# Patient Record
Sex: MALE | Race: WHITE | Employment: UNEMPLOYED | URBAN - METROPOLITAN AREA
[De-identification: names, ages, dates, MRNs, and addresses within clinical notes are randomized per-mention and may not be internally consistent; named-entity substitution may affect disease eponyms.]

---

## 2022-02-25 ENCOUNTER — APPOINTMENT (OUTPATIENT)
Dept: CT IMAGING | Age: 37
End: 2022-02-25
Payer: COMMERCIAL

## 2022-02-25 ENCOUNTER — HOSPITAL ENCOUNTER (EMERGENCY)
Age: 37
Discharge: HOME | End: 2022-02-25
Attending: EMERGENCY MEDICINE
Payer: COMMERCIAL

## 2022-02-25 VITALS
WEIGHT: 215 LBS | OXYGEN SATURATION: 100 % | HEART RATE: 66 BPM | SYSTOLIC BLOOD PRESSURE: 122 MMHG | TEMPERATURE: 98 F | DIASTOLIC BLOOD PRESSURE: 71 MMHG | HEIGHT: 71 IN | BODY MASS INDEX: 30.1 KG/M2 | RESPIRATION RATE: 16 BRPM

## 2022-02-25 DIAGNOSIS — F13.10 XANAX USE DISORDER, MILD (HCC): ICD-10-CM

## 2022-02-25 DIAGNOSIS — R40.0 SOMNOLENCE: Primary | ICD-10-CM

## 2022-02-25 LAB
ANION GAP SERPL CALCULATED.3IONS-SCNC: 9 MMOL/L (ref 3–16)
BASOPHILS ABSOLUTE: 0.1 K/UL (ref 0–0.2)
BASOPHILS RELATIVE PERCENT: 0.9 %
BUN BLDV-MCNC: 13 MG/DL (ref 7–20)
CALCIUM SERPL-MCNC: 9.1 MG/DL (ref 8.3–10.6)
CHLORIDE BLD-SCNC: 99 MMOL/L (ref 99–110)
CO2: 28 MMOL/L (ref 21–32)
CREAT SERPL-MCNC: 1 MG/DL (ref 0.9–1.3)
EOSINOPHILS ABSOLUTE: 0.1 K/UL (ref 0–0.6)
EOSINOPHILS RELATIVE PERCENT: 1.7 %
ETHANOL: NORMAL MG/DL (ref 0–0.08)
GFR AFRICAN AMERICAN: >60
GFR NON-AFRICAN AMERICAN: >60
GLUCOSE BLD-MCNC: 167 MG/DL (ref 70–99)
HCT VFR BLD CALC: 37.8 % (ref 40.5–52.5)
HEMOGLOBIN: 13 G/DL (ref 13.5–17.5)
LYMPHOCYTES ABSOLUTE: 1.1 K/UL (ref 1–5.1)
LYMPHOCYTES RELATIVE PERCENT: 13.5 %
MCH RBC QN AUTO: 29.8 PG (ref 26–34)
MCHC RBC AUTO-ENTMCNC: 34.4 G/DL (ref 31–36)
MCV RBC AUTO: 86.6 FL (ref 80–100)
MONOCYTES ABSOLUTE: 0.6 K/UL (ref 0–1.3)
MONOCYTES RELATIVE PERCENT: 7.7 %
NEUTROPHILS ABSOLUTE: 6.3 K/UL (ref 1.7–7.7)
NEUTROPHILS RELATIVE PERCENT: 76.2 %
PDW BLD-RTO: 12.8 % (ref 12.4–15.4)
PLATELET # BLD: 329 K/UL (ref 135–450)
PMV BLD AUTO: 7.2 FL (ref 5–10.5)
POTASSIUM REFLEX MAGNESIUM: 3.6 MMOL/L (ref 3.5–5.1)
RBC # BLD: 4.36 M/UL (ref 4.2–5.9)
SODIUM BLD-SCNC: 136 MMOL/L (ref 136–145)
SPECIMEN STATUS: NORMAL
WBC # BLD: 8.3 K/UL (ref 4–11)

## 2022-02-25 PROCEDURE — 85025 COMPLETE CBC W/AUTO DIFF WBC: CPT

## 2022-02-25 PROCEDURE — 82077 ASSAY SPEC XCP UR&BREATH IA: CPT

## 2022-02-25 PROCEDURE — 70450 CT HEAD/BRAIN W/O DYE: CPT

## 2022-02-25 PROCEDURE — 2580000003 HC RX 258: Performed by: EMERGENCY MEDICINE

## 2022-02-25 PROCEDURE — 99284 EMERGENCY DEPT VISIT MOD MDM: CPT

## 2022-02-25 PROCEDURE — 80048 BASIC METABOLIC PNL TOTAL CA: CPT

## 2022-02-25 PROCEDURE — 6370000000 HC RX 637 (ALT 250 FOR IP): Performed by: EMERGENCY MEDICINE

## 2022-02-25 RX ORDER — AMMONIA INHALANTS 0.04 G/.3ML
INHALANT RESPIRATORY (INHALATION)
Status: DISCONTINUED
Start: 2022-02-25 | End: 2022-02-25 | Stop reason: HOSPADM

## 2022-02-25 RX ORDER — AMMONIA INHALANTS 0.04 G/.3ML
0.3 INHALANT RESPIRATORY (INHALATION) ONCE
Status: COMPLETED | OUTPATIENT
Start: 2022-02-25 | End: 2022-02-25

## 2022-02-25 RX ORDER — 0.9 % SODIUM CHLORIDE 0.9 %
1000 INTRAVENOUS SOLUTION INTRAVENOUS ONCE
Status: COMPLETED | OUTPATIENT
Start: 2022-02-25 | End: 2022-02-25

## 2022-02-25 RX ADMIN — SODIUM CHLORIDE 1000 ML: 9 INJECTION, SOLUTION INTRAVENOUS at 01:57

## 2022-02-25 RX ADMIN — AMMONIA INHALANTS 0.3 ML: 0.04 INHALANT RESPIRATORY (INHALATION) at 05:14

## 2022-02-25 ASSESSMENT — PAIN DESCRIPTION - LOCATION: LOCATION: NECK

## 2022-02-25 ASSESSMENT — PAIN DESCRIPTION - PAIN TYPE: TYPE: ACUTE PAIN

## 2022-02-25 ASSESSMENT — PAIN SCALES - GENERAL: PAINLEVEL_OUTOF10: 7

## 2022-02-25 ASSESSMENT — PAIN - FUNCTIONAL ASSESSMENT: PAIN_FUNCTIONAL_ASSESSMENT: 0-10

## 2022-02-25 NOTE — ED NOTES
Bed: 17  Expected date:   Expected time:   Means of arrival:   Comments:  Aubrey 57     Carmela Taylor RN  02/25/22 5846

## 2022-02-25 NOTE — ED NOTES
Patient left via personal vehicle to private residence in stable condition with police. Patients vitals were assessed before discharge and were stable. Patient verbalized understanding of discharge instructions, follow up and medications. RN explained information in discharge packet and patient verbalized they have no questions at this time. Patient left ER with all paperwork and belongings that RN is aware of.         Keshawn Morgan RN  02/25/22 0919

## 2022-02-25 NOTE — ED PROVIDER NOTES
 Smoking status: Current Every Day Smoker     Packs/day: 0.50     Years: 8.00     Pack years: 4.00    Smokeless tobacco: Never Used   Substance and Sexual Activity    Alcohol use: Yes     Comment: occasional    Drug use: Not on file    Sexual activity: Not on file   Other Topics Concern    Not on file   Social History Narrative    Not on file     Social Determinants of Health     Financial Resource Strain:     Difficulty of Paying Living Expenses: Not on file   Food Insecurity:     Worried About Running Out of Food in the Last Year: Not on file    Justus of Food in the Last Year: Not on file   Transportation Needs:     Lack of Transportation (Medical): Not on file    Lack of Transportation (Non-Medical): Not on file   Physical Activity:     Days of Exercise per Week: Not on file    Minutes of Exercise per Session: Not on file   Stress:     Feeling of Stress : Not on file   Social Connections:     Frequency of Communication with Friends and Family: Not on file    Frequency of Social Gatherings with Friends and Family: Not on file    Attends [employer] Services: Not on file    Active Member of [address] [place] [address] or Organizations: Not on file    Attends Club or Organization Meetings: Not on file    Marital Status: Not on file   Intimate Partner Violence:     Fear of Current or Ex-Partner: Not on file    Emotionally Abused: Not on file    Physically Abused: Not on file    Sexually Abused: Not on file   Housing Stability:     Unable to Pay for Housing in the Last Year: Not on file    Number of [place] in the Last Year: Not on file    Unstable Housing in the Last Year: Not on file     No current facility-administered medications for this encounter. Current Outpatient Medications   Medication Sig Dispense Refill    ibuprofen (IBU) 800 MG tablet Take 1 tablet by mouth every 8 hours as needed for Pain for 10 days.  30 tablet 0    alprazolam (XANAX) 1 MG tablet        No Known Allergies    REVIEW OF SYSTEMS  Limited secondary to somnolence. General:  No fevers  Eyes:  No recent vison changes  ENT:  No sore throat, no nasal congestion  Cardiovascular:  no palpitations  Respiratory:   no cough, no wheezing  Gastrointestinal:  No abdominal pain, no vomiting, no diarrhea  Musculoskeletal:  No muscle pain, no joint pain  Skin:  No rash   Neurologic:  No speech problems, no headache, no extremity numbness, no extremity weakness  Genitourinary:  No dysuria  Extremities:  no edema, no pain      Unless otherwise stated in this report, this patient's positive and negative responses for review of systems (constitutional, eyes, ENT, cardiovascular, respiratory, gastrointestinal, neurological, genitourinary, musculoskeletal, integument systems and systems related to the presenting problem) are either stated in the preceding paragraph, were not pertinent or were negative for the symptoms and/or complaints related to the medical problem. PHYSICAL EXAM  /71   Pulse 66   Temp 98 °F (36.7 °C) (Oral)   Resp 16   Ht 5' 11\" (1.803 m)   Wt 215 lb (97.5 kg)   SpO2 100%   BMI 29.99 kg/m²   GENERAL APPEARANCE: Somnolent but easily arousable to voice. Cooperative. No acute distress. HEAD: Normocephalic. Atraumatic. EYES: EOM's grossly intact. ENT: Mucous membranes are moist.   NECK: Supple, trachea midline. HEART: RRR. LUNGS: Respirations unlabored. CTAB. Good air exchange. No wheezes, rales, or rhonchi. Speaking comfortably in full sentences. ABDOMEN: Soft. Non-distended. Non-tender. No guarding or rebound. EXTREMITIES: No peripheral edema. MAEE. No acute deformities. SKIN: Warm, dry and intact. No acute rashes. NEUROLOGICAL: Somnolent but easily arousable. Oriented x3. Cranial nerves II through XII grossly intact. No focal motor or sensory deficits. PSYCHIATRIC: Normal mood and affect. LABS  I have reviewed all labs for this visit.    Results for orders placed or performed during the hospital encounter of 02/25/22   Ethanol   Result Value Ref Range    Ethanol Lvl None Detected mg/dL   CBC with Auto Differential   Result Value Ref Range    WBC 8.3 4.0 - 11.0 K/uL    RBC 4.36 4.20 - 5.90 M/uL    Hemoglobin 13.0 (L) 13.5 - 17.5 g/dL    Hematocrit 37.8 (L) 40.5 - 52.5 %    MCV 86.6 80.0 - 100.0 fL    MCH 29.8 26.0 - 34.0 pg    MCHC 34.4 31.0 - 36.0 g/dL    RDW 12.8 12.4 - 15.4 %    Platelets 170 527 - 422 K/uL    MPV 7.2 5.0 - 10.5 fL    Neutrophils % 76.2 %    Lymphocytes % 13.5 %    Monocytes % 7.7 %    Eosinophils % 1.7 %    Basophils % 0.9 %    Neutrophils Absolute 6.3 1.7 - 7.7 K/uL    Lymphocytes Absolute 1.1 1.0 - 5.1 K/uL    Monocytes Absolute 0.6 0.0 - 1.3 K/uL    Eosinophils Absolute 0.1 0.0 - 0.6 K/uL    Basophils Absolute 0.1 0.0 - 0.2 K/uL   Basic Metabolic Panel w/ Reflex to MG   Result Value Ref Range    Sodium 136 136 - 145 mmol/L    Potassium reflex Magnesium 3.6 3.5 - 5.1 mmol/L    Chloride 99 99 - 110 mmol/L    CO2 28 21 - 32 mmol/L    Anion Gap 9 3 - 16    Glucose 167 (H) 70 - 99 mg/dL    BUN 13 7 - 20 mg/dL    CREATININE 1.0 0.9 - 1.3 mg/dL    GFR Non-African American >60 >60    GFR African American >60 >60    Calcium 9.1 8.3 - 10.6 mg/dL   Sample possible blood bank testing   Result Value Ref Range    Specimen Status NANCY              RADIOLOGY  X-RAYS: ALL IMAGES INCLUDING PLAIN FILMS, CT, ULTRASOUND AND MRI HAVE BEEN READ BY THE RADIOLOGIST. I have personally reviewed plain film images and have reviewed the radiology reports. CT HEAD WO CONTRAST   Final Result   No acute intracranial hemorrhage or ischemia. Rechecks: Physical assessment performed. Patient was updated on his CT and lab work findings. He was allowed to sleep in the ER for 4 hours. We went to try to wake him up he was fighting us and clenching his eyes closed. Would not cooperate. ED COURSE/MDM  Patient seen and evaluated. Here the patient is afebrile with normal vitals signs.  Old records reviewed. He appears intoxicated. He has slurred speech and is somnolent. He is initially quite easily arousable to voice. Alcohol level is negative. He does admit to taking Xanax tonight. Lab work is otherwise unremarkable. Head CT is normal.  [facility]. He was allowed to sleep. We went to wake up the patient in the morning he would not cooperate with us. He is still moving all extremities but is fighting against as. He is clenching his eyes shut when I try to open them. We actually had to call police to give the patient transportation home. When police arrived the patient woke up without issue and the police agreed to transport the patient home. We had attempted to call the patient's contacts for a ride home but there was no answer. I think his somnolence is secondary to drug ingestion and I do not suspect any infection. Even though he was somnolent on arrival he was able to tell me the events from [facility] and provide all of his personal information. He is not actually altered, just somnolent and intoxicated. He was counseled at length on the inappropriateness of abusing benzodiazepines and that it is very risky and can be life-threatening and overdose. He states understanding. labs and imaging reviewed and results discussed with patient. Patient was reassessed as noted above . Plan of care discussed with patient. Patient in agreement with plan. Strict return precautions have been given. Patient was given scripts for the following medications. I counseled patient how to take these medications. Discharge Medication List as of 2/25/2022  5:16 AM        No prescriptions given. CLINICAL IMPRESSION  1. Somnolence    2. Xanax use disorder, mild (HCC)        Blood pressure 122/71, pulse 66, temperature 98 °F (36.7 °C), temperature source Oral, resp.  rate 16, height 5' 11\" (1.803 m), weight 215 lb (97.5 kg), SpO2 100 %. DISPOSITION  Trev Hackett was discharged to home in stable condition.     (Please note this note was completed with a voice recognition program.  Efforts were made to edit the dictations but occasionally words are mis-transcribed.)        Ana Lilia Alcantara MD  02/26/22 0532

## 2022-03-28 ENCOUNTER — HOSPITAL ENCOUNTER (INPATIENT)
Age: 37
LOS: 2 days | Discharge: HOME | DRG: 751 | End: 2022-03-30
Attending: EMERGENCY MEDICINE | Admitting: PSYCHIATRY & NEUROLOGY
Payer: MEDICAID

## 2022-03-28 DIAGNOSIS — F22 PARANOIA (HCC): Primary | ICD-10-CM

## 2022-03-28 DIAGNOSIS — F29 PSYCHOSIS, UNSPECIFIED PSYCHOSIS TYPE (HCC): ICD-10-CM

## 2022-03-28 PROBLEM — F33.9 MAJOR DEPRESSIVE DISORDER, RECURRENT (HCC): Status: ACTIVE | Noted: 2022-03-28

## 2022-03-28 LAB
A/G RATIO: 2 (ref 1.1–2.2)
ACETAMINOPHEN LEVEL: <5 UG/ML (ref 10–30)
ALBUMIN SERPL-MCNC: 4.6 G/DL (ref 3.4–5)
ALP BLD-CCNC: 38 U/L (ref 40–129)
ALT SERPL-CCNC: 20 U/L (ref 10–40)
AMPHETAMINE SCREEN, URINE: POSITIVE
ANION GAP SERPL CALCULATED.3IONS-SCNC: 13 MMOL/L (ref 3–16)
AST SERPL-CCNC: 27 U/L (ref 15–37)
BARBITURATE SCREEN URINE: ABNORMAL
BASOPHILS ABSOLUTE: 0 K/UL (ref 0–0.2)
BASOPHILS RELATIVE PERCENT: 0.9 %
BENZODIAZEPINE SCREEN, URINE: POSITIVE
BILIRUB SERPL-MCNC: 0.5 MG/DL (ref 0–1)
BUN BLDV-MCNC: 11 MG/DL (ref 7–20)
CALCIUM SERPL-MCNC: 10 MG/DL (ref 8.3–10.6)
CANNABINOID SCREEN URINE: ABNORMAL
CHLORIDE BLD-SCNC: 99 MMOL/L (ref 99–110)
CO2: 27 MMOL/L (ref 21–32)
COCAINE METABOLITE SCREEN URINE: ABNORMAL
CREAT SERPL-MCNC: 0.9 MG/DL (ref 0.9–1.3)
EOSINOPHILS ABSOLUTE: 0.1 K/UL (ref 0–0.6)
EOSINOPHILS RELATIVE PERCENT: 2.8 %
ETHANOL: NORMAL MG/DL (ref 0–0.08)
GFR AFRICAN AMERICAN: >60
GFR NON-AFRICAN AMERICAN: >60
GLUCOSE BLD-MCNC: 112 MG/DL (ref 70–99)
HCT VFR BLD CALC: 34.9 % (ref 40.5–52.5)
HEMOGLOBIN: 11.9 G/DL (ref 13.5–17.5)
LYMPHOCYTES ABSOLUTE: 1.1 K/UL (ref 1–5.1)
LYMPHOCYTES RELATIVE PERCENT: 23 %
Lab: ABNORMAL
MCH RBC QN AUTO: 29.9 PG (ref 26–34)
MCHC RBC AUTO-ENTMCNC: 34.1 G/DL (ref 31–36)
MCV RBC AUTO: 87.7 FL (ref 80–100)
METHADONE SCREEN, URINE: ABNORMAL
MONOCYTES ABSOLUTE: 0.4 K/UL (ref 0–1.3)
MONOCYTES RELATIVE PERCENT: 8.5 %
NEUTROPHILS ABSOLUTE: 3.1 K/UL (ref 1.7–7.7)
NEUTROPHILS RELATIVE PERCENT: 64.8 %
OPIATE SCREEN URINE: ABNORMAL
OXYCODONE URINE: ABNORMAL
PDW BLD-RTO: 13.5 % (ref 12.4–15.4)
PH UA: 6
PHENCYCLIDINE SCREEN URINE: ABNORMAL
PLATELET # BLD: 308 K/UL (ref 135–450)
PMV BLD AUTO: 6.6 FL (ref 5–10.5)
POTASSIUM REFLEX MAGNESIUM: 3.9 MMOL/L (ref 3.5–5.1)
PROPOXYPHENE SCREEN: ABNORMAL
RBC # BLD: 3.98 M/UL (ref 4.2–5.9)
SALICYLATE, SERUM: <0.3 MG/DL (ref 15–30)
SARS-COV-2, NAAT: NOT DETECTED
SODIUM BLD-SCNC: 139 MMOL/L (ref 136–145)
TOTAL PROTEIN: 6.9 G/DL (ref 6.4–8.2)
TSH SERPL DL<=0.05 MIU/L-ACNC: 1.53 UIU/ML (ref 0.27–4.2)
WBC # BLD: 4.8 K/UL (ref 4–11)

## 2022-03-28 PROCEDURE — 80307 DRUG TEST PRSMV CHEM ANLYZR: CPT

## 2022-03-28 PROCEDURE — 87635 SARS-COV-2 COVID-19 AMP PRB: CPT

## 2022-03-28 PROCEDURE — 6370000000 HC RX 637 (ALT 250 FOR IP)

## 2022-03-28 PROCEDURE — 82077 ASSAY SPEC XCP UR&BREATH IA: CPT

## 2022-03-28 PROCEDURE — 6370000000 HC RX 637 (ALT 250 FOR IP): Performed by: PSYCHIATRY & NEUROLOGY

## 2022-03-28 PROCEDURE — 84443 ASSAY THYROID STIM HORMONE: CPT

## 2022-03-28 PROCEDURE — 80143 DRUG ASSAY ACETAMINOPHEN: CPT

## 2022-03-28 PROCEDURE — 80053 COMPREHEN METABOLIC PANEL: CPT

## 2022-03-28 PROCEDURE — 85025 COMPLETE CBC W/AUTO DIFF WBC: CPT

## 2022-03-28 PROCEDURE — 36415 COLL VENOUS BLD VENIPUNCTURE: CPT

## 2022-03-28 PROCEDURE — 80179 DRUG ASSAY SALICYLATE: CPT

## 2022-03-28 PROCEDURE — 1240000000 HC EMOTIONAL WELLNESS R&B

## 2022-03-28 PROCEDURE — 99283 EMERGENCY DEPT VISIT LOW MDM: CPT

## 2022-03-28 RX ORDER — MAGNESIUM HYDROXIDE/ALUMINUM HYDROXICE/SIMETHICONE 120; 1200; 1200 MG/30ML; MG/30ML; MG/30ML
30 SUSPENSION ORAL EVERY 6 HOURS PRN
Status: DISCONTINUED | OUTPATIENT
Start: 2022-03-28 | End: 2022-03-30 | Stop reason: HOSPADM

## 2022-03-28 RX ORDER — CLONAZEPAM 0.5 MG/1
0.5 TABLET ORAL 2 TIMES DAILY
Status: ON HOLD | COMMUNITY
End: 2022-03-30 | Stop reason: HOSPADM

## 2022-03-28 RX ORDER — HYDROXYZINE 50 MG/1
50 TABLET, FILM COATED ORAL 3 TIMES DAILY PRN
Status: DISCONTINUED | OUTPATIENT
Start: 2022-03-28 | End: 2022-03-30 | Stop reason: HOSPADM

## 2022-03-28 RX ORDER — OLANZAPINE 10 MG/1
10 TABLET ORAL EVERY 8 HOURS PRN
Status: DISCONTINUED | OUTPATIENT
Start: 2022-03-28 | End: 2022-03-29

## 2022-03-28 RX ORDER — BUPRENORPHINE AND NALOXONE 8; 2 MG/1; MG/1
1 FILM, SOLUBLE BUCCAL; SUBLINGUAL 2 TIMES DAILY
COMMUNITY

## 2022-03-28 RX ORDER — TRAZODONE HYDROCHLORIDE 50 MG/1
50 TABLET ORAL NIGHTLY PRN
Status: DISCONTINUED | OUTPATIENT
Start: 2022-03-28 | End: 2022-03-30 | Stop reason: HOSPADM

## 2022-03-28 RX ORDER — CLONAZEPAM 0.5 MG/1
0.5 TABLET ORAL 3 TIMES DAILY PRN
Status: DISCONTINUED | OUTPATIENT
Start: 2022-03-28 | End: 2022-03-29

## 2022-03-28 RX ORDER — ACETAMINOPHEN 325 MG/1
650 TABLET ORAL EVERY 4 HOURS PRN
Status: DISCONTINUED | OUTPATIENT
Start: 2022-03-28 | End: 2022-03-29

## 2022-03-28 RX ORDER — IBUPROFEN 800 MG/1
800 TABLET ORAL EVERY 8 HOURS PRN
Status: DISCONTINUED | OUTPATIENT
Start: 2022-03-28 | End: 2022-03-29

## 2022-03-28 RX ORDER — IBUPROFEN 400 MG/1
400 TABLET ORAL EVERY 6 HOURS PRN
Status: DISCONTINUED | OUTPATIENT
Start: 2022-03-28 | End: 2022-03-28

## 2022-03-28 RX ORDER — POLYETHYLENE GLYCOL 3350 17 G
2 POWDER IN PACKET (EA) ORAL
Status: DISCONTINUED | OUTPATIENT
Start: 2022-03-28 | End: 2022-03-30 | Stop reason: HOSPADM

## 2022-03-28 RX ORDER — DIPHENHYDRAMINE HYDROCHLORIDE 50 MG/ML
50 INJECTION INTRAMUSCULAR; INTRAVENOUS EVERY 4 HOURS PRN
Status: DISCONTINUED | OUTPATIENT
Start: 2022-03-28 | End: 2022-03-29

## 2022-03-28 RX ORDER — BUPRENORPHINE AND NALOXONE 8; 2 MG/1; MG/1
1 FILM, SOLUBLE BUCCAL; SUBLINGUAL 2 TIMES DAILY
Status: DISCONTINUED | OUTPATIENT
Start: 2022-03-28 | End: 2022-03-30 | Stop reason: HOSPADM

## 2022-03-28 RX ORDER — CLONAZEPAM 0.5 MG/1
0.5 TABLET ORAL 3 TIMES DAILY
Status: DISCONTINUED | OUTPATIENT
Start: 2022-03-28 | End: 2022-03-28

## 2022-03-28 RX ADMIN — IBUPROFEN 800 MG: 800 TABLET, FILM COATED ORAL at 23:14

## 2022-03-28 RX ADMIN — BUPRENORPHINE HYDROCHLORIDE, NALOXONE HYDROCHLORIDE 1 FILM: 8; 2 FILM, SOLUBLE BUCCAL; SUBLINGUAL at 12:04

## 2022-03-28 RX ADMIN — CLONAZEPAM 0.5 MG: 0.5 TABLET ORAL at 16:06

## 2022-03-28 RX ADMIN — CLONAZEPAM 0.5 MG: 0.5 TABLET ORAL at 22:12

## 2022-03-28 RX ADMIN — NICOTINE POLACRILEX 2 MG: 2 LOZENGE ORAL at 11:46

## 2022-03-28 RX ADMIN — BUPRENORPHINE HYDROCHLORIDE, NALOXONE HYDROCHLORIDE 1 FILM: 8; 2 FILM, SOLUBLE BUCCAL; SUBLINGUAL at 20:47

## 2022-03-28 ASSESSMENT — SLEEP AND FATIGUE QUESTIONNAIRES
DIFFICULTY FALLING ASLEEP: YES
SLEEP PATTERN: DIFFICULTY FALLING ASLEEP
AVERAGE NUMBER OF SLEEP HOURS: 6
DO YOU HAVE DIFFICULTY SLEEPING: YES
DIFFICULTY STAYING ASLEEP: NO
DIFFICULTY ARISING: NO
RESTFUL SLEEP: YES
DO YOU USE A SLEEP AID: YES

## 2022-03-28 ASSESSMENT — LIFESTYLE VARIABLES: HISTORY_ALCOHOL_USE: NO

## 2022-03-28 ASSESSMENT — PAIN SCALES - GENERAL
PAINLEVEL_OUTOF10: 0
PAINLEVEL_OUTOF10: 0
PAINLEVEL_OUTOF10: 6
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
## 2022-03-28 NOTE — ED NOTES
Patient vs obtained. Patient awoken growled at this nurse then laughed and fell back asleep. Breakfast was given to patient however continued to sleep.       Otilia Wheeler RN  03/28/22 0800

## 2022-03-28 NOTE — ED NOTES
Call received from [address] [place] [address] Dr Director of [facility] who reports to cancel transfer order. Reports we will have discharges today here at [facility]. Call placed to Juan Kelly and notified them patient to be admitted here at [facility].       Reva Cueto RN  03/28/22 0813       Reva Cueto RN  03/28/22 7474
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
## 2022-03-28 NOTE — ED NOTES
Attempted to obtain vitals after patient refusal. Patient again refused vital signs at this time.       Jenni Gutierres RN  03/28/22 5445

## 2022-03-28 NOTE — ED NOTES
Patient updated on plan of care. Patient is not happy related to decision to transfer and be admitted to inpatient psychiatric unit. This RN explained to process of admission, and the evaluation process.      Mally Chan RN  03/28/22 2503 Fernando Schulz  McRae Helena, RN  03/28/22 3211

## 2022-03-28 NOTE — ED NOTES
Patient resting in room with lights no, no acute distress noted.      Magdiel Díaz RN  03/28/22 6499

## 2022-03-28 NOTE — ED NOTES
Call placed to [facility]. Spoke with [place]. Reports patient was discharged on the 3/24. Patient given 14 day supply of Suboxine 8 mg to take 2 x day.       Matty Gibbons RN  03/28/22 8085

## 2022-03-28 NOTE — ED NOTES
Level of Care Disposition: Transfer      Patient was seen by ED provider and [facility] AN AFFILIATE OF [facility] staff. The case presented to psychiatric provider on-call BRUCE Jasso. Based on the ED evaluation and information presented to the provider by Petey Dangelo it is the recommendation of the on call psychiatric provider that inpatient hospitalization is the least restrictive environment for the patient at this time. The Transfer Center is aware of patient.          Dami Russell RN  03/28/22 1161
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
## 2022-03-28 NOTE — ED NOTES
Call placed to Litzy LYON to update her on Suboxine. Message left. Awaiting call back.       Javier Carlos RN  03/28/22 7465

## 2022-03-28 NOTE — ED NOTES
Patient awake and calm and cooperative. Will continue to monitor patient.       Javier Carlos RN  03/28/22 0485

## 2022-03-28 NOTE — ED NOTES
Spoke to [place] APRN-CNP. Notified her beds to open here on [facility] and that Cass Leal informed this nurse patient to be admitted here. Admission orders to be obtained.       Gladis Calderon RN  03/28/22 4059

## 2022-03-28 NOTE — ED NOTES
Report given to [place] RN charge on I. Bed ready. Security called to assist patient to [facility].       Aakash Musa RN  03/28/22 5506

## 2022-03-29 PROBLEM — F41.1 GAD (GENERALIZED ANXIETY DISORDER): Status: ACTIVE | Noted: 2022-03-29

## 2022-03-29 PROBLEM — F15.20 AMPHETAMINE USE DISORDER, SEVERE, IN CONTROLLED ENVIRONMENT (HCC): Status: ACTIVE | Noted: 2022-03-29

## 2022-03-29 PROBLEM — F11.20 OPIOID USE DISORDER, SEVERE, ON MAINTENANCE THERAPY (HCC): Status: ACTIVE | Noted: 2022-03-29

## 2022-03-29 PROBLEM — F29 PSYCHOSIS (HCC): Status: ACTIVE | Noted: 2022-03-28

## 2022-03-29 PROBLEM — B18.2 HEP C W/O COMA, CHRONIC (HCC): Status: ACTIVE | Noted: 2022-03-29

## 2022-03-29 LAB
CHOLESTEROL, TOTAL: 205 MG/DL (ref 0–199)
HDLC SERPL-MCNC: 75 MG/DL (ref 40–60)
LDL CHOLESTEROL CALCULATED: 121 MG/DL
TRIGL SERPL-MCNC: 43 MG/DL (ref 0–150)
VLDLC SERPL CALC-MCNC: 9 MG/DL

## 2022-03-29 PROCEDURE — 83036 HEMOGLOBIN GLYCOSYLATED A1C: CPT

## 2022-03-29 PROCEDURE — 36415 COLL VENOUS BLD VENIPUNCTURE: CPT

## 2022-03-29 PROCEDURE — 80061 LIPID PANEL: CPT

## 2022-03-29 PROCEDURE — 99223 1ST HOSP IP/OBS HIGH 75: CPT | Performed by: PSYCHIATRY & NEUROLOGY

## 2022-03-29 PROCEDURE — 6370000000 HC RX 637 (ALT 250 FOR IP): Performed by: PSYCHIATRY & NEUROLOGY

## 2022-03-29 PROCEDURE — 1240000000 HC EMOTIONAL WELLNESS R&B

## 2022-03-29 PROCEDURE — 6370000000 HC RX 637 (ALT 250 FOR IP)

## 2022-03-29 RX ORDER — ACETAMINOPHEN 325 MG/1
650 TABLET ORAL EVERY 4 HOURS PRN
Status: DISCONTINUED | OUTPATIENT
Start: 2022-03-29 | End: 2022-03-30 | Stop reason: HOSPADM

## 2022-03-29 RX ORDER — IBUPROFEN 800 MG/1
800 TABLET ORAL EVERY 8 HOURS PRN
Status: DISCONTINUED | OUTPATIENT
Start: 2022-03-29 | End: 2022-03-30 | Stop reason: HOSPADM

## 2022-03-29 RX ORDER — CLONAZEPAM 0.5 MG/1
0.5 TABLET ORAL 3 TIMES DAILY PRN
Status: DISCONTINUED | OUTPATIENT
Start: 2022-03-29 | End: 2022-03-30 | Stop reason: HOSPADM

## 2022-03-29 RX ADMIN — CLONAZEPAM 0.5 MG: 0.5 TABLET ORAL at 09:14

## 2022-03-29 RX ADMIN — CLONAZEPAM 0.5 MG: 0.5 TABLET ORAL at 19:24

## 2022-03-29 RX ADMIN — NICOTINE POLACRILEX 2 MG: 2 LOZENGE ORAL at 11:44

## 2022-03-29 RX ADMIN — BUPRENORPHINE HYDROCHLORIDE, NALOXONE HYDROCHLORIDE 1 FILM: 8; 2 FILM, SOLUBLE BUCCAL; SUBLINGUAL at 20:53

## 2022-03-29 RX ADMIN — BUPRENORPHINE HYDROCHLORIDE, NALOXONE HYDROCHLORIDE 1 FILM: 8; 2 FILM, SOLUBLE BUCCAL; SUBLINGUAL at 09:14

## 2022-03-29 RX ADMIN — NICOTINE POLACRILEX 2 MG: 2 LOZENGE ORAL at 22:15

## 2022-03-29 RX ADMIN — IBUPROFEN 800 MG: 800 TABLET, FILM COATED ORAL at 15:02

## 2022-03-29 RX ADMIN — TRAZODONE HYDROCHLORIDE 50 MG: 50 TABLET ORAL at 20:53

## 2022-03-29 ASSESSMENT — PAIN SCALES - GENERAL: PAINLEVEL_OUTOF10: 5

## 2022-03-29 NOTE — FLOWSHEET NOTE
Purposeful Rounding    Patient Location: Patient room    Patient willing to engage in conversation: No    Presentation/behavior: Other resting*    Affect: Neutral/Euthymic(normal)    Concerns reported: none    PRN medications given: none    Environmental assessment: No safety hazards noted    Fall prevention interventions in place: Yellow non-skid socks on    Daily [place] Fall Risk Score: 53    Daily Rodrigues Fall Risk Score:0      Electronically signed by Amandeep Gonzalez RN on 3/29/22 at 12:41 AM EDT

## 2022-03-29 NOTE — PROGRESS NOTES
Behavioral Services  Medicare Certification Upon Admission    I certify that this patient's inpatient psychiatric hospital admission is medically necessary for:    [x] (1) Treatment which could reasonably be expected to improve this patient's condition,       [x] (2) Or for diagnostic study;     AND     [x](2) The inpatient psychiatric services are provided while the individual is under the care of a physician and are included in the individualized plan of care.     Estimated length of stay/service 2-3 days    Plan for post-hospital care incomplete     Electronically signed by Lazarus Olivas MD on 3/29/2022 at 2:13 PM

## 2022-03-29 NOTE — PLAN OF CARE
Problem: Altered Mood, Depressive Behavior:  Goal: Able to verbalize and/or display a decrease in depressive symptoms  Description: Able to verbalize and/or display a decrease in depressive symptoms  3/29/2022 1654 by Mary Deras RN  Outcome: Ongoing     Problem: Altered Mood, Depressive Behavior:  Goal: Ability to disclose and discuss suicidal ideas will improve  Description: Ability to disclose and discuss suicidal ideas will improve  Outcome: Ongoing     Problem: Altered Mood, Depressive Behavior:  Goal: Absence of self-harm  Description: Absence of self-harm  Outcome: Ongoing     Problem: Substance Abuse:  Goal: Absence of drug withdrawal signs and symptoms  Description: Absence of drug withdrawal signs and symptoms  Outcome: Ongoing     Problem: Pain:  Goal: Pain level will decrease  Description: Pain level will decrease  Outcome: Ongoing     Problem: Pain:  Goal: Control of acute pain  Description: Control of acute pain  Outcome: Ongoing   Donnal Code was visible at the beginning of the shift at the TS requesting pain medicine for his left foot. Medicated w/ Ibu 800 per request. He denies any Si, Hi or AVH and CFS. Showing no s & s of drug w/d.  After a brief phone conversation he returned to his room to rest.

## 2022-03-29 NOTE — GROUP NOTE
Group Therapy Note    Date: 3/29/2022    Group Start Time: 1000  Group End Time: 8158  Group Topic: Psychoeducation    [facility]    HOLLIE Carr        Group Therapy Note  The clinician introduced the anger ice delmi and the group members identified their triggers, behaviors and repercussions for their behaviors. The clinician introduced the [facility] - RODRIGUEZ to control anger :25 tips to help you stay calm. Clinician read the 25 tips and provided the handouts for the patients to keep. Attendees: 12         Patient's Goal:      Notes:  Patient was cooperative and engaged in the group discussion. He gave his examples of triggers, behaviors and consequences when the clinician asked for examples. Status After Intervention:  Improved    Participation Level:  Active Listener    Participation Quality: Attentive      Speech:  hesitant      Thought Process/Content: Linear      Affective Functioning: Blunted      Mood: depressed      Level of consciousness:  Attentive      Response to Learning: Able to verbalize/acknowledge new learning      Endings: None Reported    Modes of Intervention: Education and Activity      Discipline Responsible: /Counselor      Signature:  HOLLIE Carr

## 2022-03-29 NOTE — GROUP NOTE
Group Therapy Note    Date: 3/29/2022    Group Start Time: 1300  Group End Time: 7026  Group Topic: Psychoeducation    [facility]        Group Therapy Note    Topic: Cycle of Anxiety, Cycle of Depression - Understanding, Communicating, Challenging    Attendees: 8         Patient's Goal:  Pt will reflect on personal experiences with anxiety and depression, explore symptoms of these while setting goals for combating behavioral disturbances. Notes: This pt was present and actively engaged during discussions of cycles of anxiety and depression, reflective with peers while processing challenges in managing symptoms of depression while experiencing anhedonia, set group goals of identifying stressors before community meeting 3/30/22. Status After Intervention:  Improved    Participation Level:  Active Listener and Interactive    Participation Quality: Sharing and Supportive      Speech:  normal      Thought Process/Content: Logical      Affective Functioning: Congruent      Mood: euthymic      Level of consciousness:  Alert      Response to Learning: Able to verbalize current knowledge/experience and Able to verbalize/acknowledge new learning      Endings: None Reported    Modes of Intervention: Education, Support, Exploration, Clarifying and Problem-solving      Discipline Responsible: Psychoeducational Specialist      Signature:  Sarabjit Connors MM, MT-BC

## 2022-03-29 NOTE — FLOWSHEET NOTE
Purposeful Rounding    Patient Location: Day room    Patient willing to engage in conversation: Yes    Presentation/behavior: Cooperative and Pleasant    Affect: Neutral/Euthymic(normal)    Concerns reported: none    PRN medications given:none    Environmental assessment: No safety hazards noted    Fall prevention interventions in place: Yellow non-skid socks on    Daily [place] Fall Risk Score: 53    Daily Rodrigues Fall Risk Score: 0      Electronically signed by Christianne Negro RN on 3/28/22 at 8:58 PM EDT
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
## 2022-03-29 NOTE — PLAN OF CARE
Patient medication compliant. Patient out in milieu interacting with peers and staff. Patient pleasant and cooperative but is suspicious. Patient denies SI/HI, AVH and pain.

## 2022-03-29 NOTE — CARE COORDINATION
[facility]  Treatment Team Note  Day 1    Review Date & Time: 03.29.2022  0900    Patient was not in treatment team      Status EXAM:   Status and Exam  Normal: Yes  Facial Expression: Brightened  Affect: Congruent  Level of Consciousness: Alert  Mood:Normal: No  Mood: Empty  Motor Activity:Normal: Yes  Interview Behavior: Cooperative  Preception: Vernon to Person,Vernon to Time,Vernon to Place,Vernon to Situation  Attention:Normal: Yes  Attention: Distractible  Thought Processes: Circumstantial  Thought Content:Normal: Yes  Thought Content: Preoccupations,Paranoia  Hallucinations: None  Delusions: No  Delusions: Persecution  Memory:Normal: Yes  Insight and Judgment: No  Insight and Judgment: Poor Insight,Poor Judgment  Present Suicidal Ideation: No  Present Homicidal Ideation: No      Suicide Risk CSSR-S:  1) Within the past month, have you wished you were dead or wished you could go to sleep and not wake up? : No  2) Have you actually had any thoughts of killing yourself? : No  6) Have you ever done anything, started to do anything, or prepared to do anything to end your life?: No      PLAN/TREATMENT RECOMMENDATIONS UPDATE: Patient will take medication as prescribed, eat 75% of meals, attend groups, participate in milieu activities, participate in treatment team and care planning for discharge and follow up.         Maritza Whipple

## 2022-03-29 NOTE — BH NOTE
Group Therapy Note    Date: 3/28/2022  Start Time: 20:00  End Time:  21:00  Number of Participants: 13    Type of Group: Recreational  Wrap up    Sp [facility] Information  Module Name:  /  Session Number:  /     Patient's Goal:  Coping skills    Notes:  Continuing to work on goal    Status After Intervention:  Unchanged    Participation Level:  Active Listener and Interactive    Participation Quality: Appropriate and Sharing      Speech:  normal      Thought Process/Content: Logical      Affective Functioning: Blunted      Mood: anxious      Level of consciousness:  Alert and Attentive      Response to Learning: Able to change behavior      Endings: None Reported    Modes of Intervention: Socialization and Problem-solving      Discipline Responsible: Behavorial Health Tech      Signature:  Reyna Torres

## 2022-03-30 VITALS
WEIGHT: 215 LBS | RESPIRATION RATE: 18 BRPM | OXYGEN SATURATION: 100 % | HEIGHT: 71 IN | SYSTOLIC BLOOD PRESSURE: 113 MMHG | TEMPERATURE: 97.8 F | DIASTOLIC BLOOD PRESSURE: 76 MMHG | BODY MASS INDEX: 30.1 KG/M2 | HEART RATE: 60 BPM

## 2022-03-30 PROBLEM — F15.922 AMPHETAMINE INTOXICATION WITH PERCEPTUAL DISTURBANCE (HCC): Status: ACTIVE | Noted: 2022-03-28

## 2022-03-30 LAB
ESTIMATED AVERAGE GLUCOSE: 111.2 MG/DL
HBA1C MFR BLD: 5.5 %

## 2022-03-30 PROCEDURE — 6370000000 HC RX 637 (ALT 250 FOR IP): Performed by: PSYCHIATRY & NEUROLOGY

## 2022-03-30 PROCEDURE — 5130000000 HC BRIDGE APPOINTMENT

## 2022-03-30 PROCEDURE — 99239 HOSP IP/OBS DSCHRG MGMT >30: CPT | Performed by: PSYCHIATRY & NEUROLOGY

## 2022-03-30 PROCEDURE — 6370000000 HC RX 637 (ALT 250 FOR IP)

## 2022-03-30 RX ADMIN — NICOTINE POLACRILEX 2 MG: 2 LOZENGE ORAL at 12:38

## 2022-03-30 RX ADMIN — CLONAZEPAM 0.5 MG: 0.5 TABLET ORAL at 08:38

## 2022-03-30 RX ADMIN — NICOTINE POLACRILEX 2 MG: 2 LOZENGE ORAL at 10:34

## 2022-03-30 RX ADMIN — BUPRENORPHINE HYDROCHLORIDE, NALOXONE HYDROCHLORIDE 1 FILM: 8; 2 FILM, SOLUBLE BUCCAL; SUBLINGUAL at 08:38

## 2022-03-30 NOTE — BH NOTE
Group Therapy Note    Date: 3/29/2022  Start Time: 20:00  End Time:  21:00  Number of Participants: 9    Type of Group: Recreational  Wrap up    [facility] Information  Module Name:  /  Session Number:  /    Patient's Goal:  Coping skills    Notes:  Continuing to work on goal    Status After Intervention:  Unchanged    Participation Level:  Active Listener and Interactive    Participation Quality: Appropriate, Attentive and Sharing      Speech:  normal      Thought Process/Content: Logical      Affective Functioning: Blunted      Mood: anxious      Level of consciousness:  Alert and Attentive      Response to Learning: Able to change behavior      Endings: None Reported    Modes of Intervention: Socialization and Problem-solving      Discipline Responsible: Behavorial Health Tech      Signature:  Maribel Hancock

## 2022-03-30 NOTE — GROUP NOTE
Group Therapy Note    Date: 3/30/2022    Group Start Time: 1100  Group End Time: 4976  Group Topic: Psychoeducation    [facility]        Group Therapy Note    Grounding Techniques - Education and Practice    Group members engaged in education discussion of grounding theories, practical uses of grounding during undesired emotions/thought processes. Group facilitator provided education on multiple grounding techniques, provided structure for group members to utilize these techniques in practice, and offered space to process efficacy and usefulness in members' daily lives. Group concluded with members setting goals for use of grounding techniques post-d/c. Attendees: 11         Notes: This pt was present across group interventions, actively engaged in utilization of grounding technique practice, collaborative with peers in completing tasks. At close of group pt voiced understanding of information provided and offered no further questions. Status After Intervention:  Improved    Participation Level:  Active Listener and Interactive    Participation Quality: Appropriate and Sharing      Speech:  normal      Thought Process/Content: Logical      Affective Functioning: Congruent      Mood: euthymic      Level of consciousness:  Alert and Attentive      Response to Learning: Able to verbalize current knowledge/experience, Able to verbalize/acknowledge new learning and Progressing to goal      Endings: None Reported    Modes of Intervention: Education, Exploration, Problem-solving, Activity and Media      Discipline Responsible: Psychoeducational Specialist      Signature:  Matthias Pickens, MM, MT-BC

## 2022-03-30 NOTE — PLAN OF CARE
Problem: Altered Mood, Depressive Behavior:  Goal: Ability to disclose and discuss suicidal ideas will improve  Description: Ability to disclose and discuss suicidal ideas will improve  3/29/2022 2044 by Goldie Morris RN  Outcome: Ongoing   Pt continues to deny any SI,HI or AVH and CFS.  Med compliant, request sleep med and nicotine
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
## 2022-05-28 ENCOUNTER — HOSPITAL ENCOUNTER (EMERGENCY)
Age: 37
Discharge: HOME | End: 2022-05-28
Attending: EMERGENCY MEDICINE
Payer: MEDICAID

## 2022-05-28 VITALS
BODY MASS INDEX: 30.1 KG/M2 | HEART RATE: 60 BPM | OXYGEN SATURATION: 96 % | RESPIRATION RATE: 14 BRPM | TEMPERATURE: 97.8 F | WEIGHT: 215 LBS | HEIGHT: 71 IN | DIASTOLIC BLOOD PRESSURE: 70 MMHG | SYSTOLIC BLOOD PRESSURE: 104 MMHG

## 2022-05-28 DIAGNOSIS — F22 PARANOIA (HCC): Primary | ICD-10-CM

## 2022-05-28 LAB
A/G RATIO: 1.9 (ref 1.1–2.2)
ACETAMINOPHEN LEVEL: <5 UG/ML (ref 10–30)
ALBUMIN SERPL-MCNC: 4.7 G/DL (ref 3.4–5)
ALP BLD-CCNC: 37 U/L (ref 40–129)
ALT SERPL-CCNC: 22 U/L (ref 10–40)
AMPHETAMINE SCREEN, URINE: POSITIVE
ANION GAP SERPL CALCULATED.3IONS-SCNC: 7 MMOL/L (ref 3–16)
AST SERPL-CCNC: 33 U/L (ref 15–37)
BARBITURATE SCREEN URINE: ABNORMAL
BASOPHILS ABSOLUTE: 0 K/UL (ref 0–0.2)
BASOPHILS RELATIVE PERCENT: 0.7 %
BENZODIAZEPINE SCREEN, URINE: POSITIVE
BILIRUB SERPL-MCNC: 0.5 MG/DL (ref 0–1)
BUN BLDV-MCNC: 6 MG/DL (ref 7–20)
CALCIUM SERPL-MCNC: 9.4 MG/DL (ref 8.3–10.6)
CANNABINOID SCREEN URINE: ABNORMAL
CHLORIDE BLD-SCNC: 99 MMOL/L (ref 99–110)
CO2: 29 MMOL/L (ref 21–32)
COCAINE METABOLITE SCREEN URINE: ABNORMAL
CREAT SERPL-MCNC: 1 MG/DL (ref 0.9–1.3)
EOSINOPHILS ABSOLUTE: 0.2 K/UL (ref 0–0.6)
EOSINOPHILS RELATIVE PERCENT: 4.8 %
ETHANOL: NORMAL MG/DL (ref 0–0.08)
GFR AFRICAN AMERICAN: >60
GFR NON-AFRICAN AMERICAN: >60
GLUCOSE BLD-MCNC: 117 MG/DL (ref 70–99)
HCT VFR BLD CALC: 36.6 % (ref 40.5–52.5)
HEMOGLOBIN: 12.7 G/DL (ref 13.5–17.5)
LYMPHOCYTES ABSOLUTE: 1.4 K/UL (ref 1–5.1)
LYMPHOCYTES RELATIVE PERCENT: 31 %
Lab: ABNORMAL
MAGNESIUM: 2 MG/DL (ref 1.8–2.4)
MCH RBC QN AUTO: 30.6 PG (ref 26–34)
MCHC RBC AUTO-ENTMCNC: 34.8 G/DL (ref 31–36)
MCV RBC AUTO: 87.9 FL (ref 80–100)
METHADONE SCREEN, URINE: ABNORMAL
MONOCYTES ABSOLUTE: 0.5 K/UL (ref 0–1.3)
MONOCYTES RELATIVE PERCENT: 11.2 %
NEUTROPHILS ABSOLUTE: 2.4 K/UL (ref 1.7–7.7)
NEUTROPHILS RELATIVE PERCENT: 52.3 %
OPIATE SCREEN URINE: ABNORMAL
OXYCODONE URINE: ABNORMAL
PDW BLD-RTO: 13.7 % (ref 12.4–15.4)
PH UA: 6
PHENCYCLIDINE SCREEN URINE: ABNORMAL
PLATELET # BLD: 266 K/UL (ref 135–450)
PMV BLD AUTO: 6.7 FL (ref 5–10.5)
POTASSIUM REFLEX MAGNESIUM: 3.4 MMOL/L (ref 3.5–5.1)
PROPOXYPHENE SCREEN: ABNORMAL
RBC # BLD: 4.16 M/UL (ref 4.2–5.9)
SALICYLATE, SERUM: <0.3 MG/DL (ref 15–30)
SODIUM BLD-SCNC: 135 MMOL/L (ref 136–145)
TOTAL PROTEIN: 7.2 G/DL (ref 6.4–8.2)
WBC # BLD: 4.6 K/UL (ref 4–11)

## 2022-05-28 PROCEDURE — 82077 ASSAY SPEC XCP UR&BREATH IA: CPT

## 2022-05-28 PROCEDURE — 83735 ASSAY OF MAGNESIUM: CPT

## 2022-05-28 PROCEDURE — 85025 COMPLETE CBC W/AUTO DIFF WBC: CPT

## 2022-05-28 PROCEDURE — 80307 DRUG TEST PRSMV CHEM ANLYZR: CPT

## 2022-05-28 PROCEDURE — 80143 DRUG ASSAY ACETAMINOPHEN: CPT

## 2022-05-28 PROCEDURE — 80179 DRUG ASSAY SALICYLATE: CPT

## 2022-05-28 PROCEDURE — 99283 EMERGENCY DEPT VISIT LOW MDM: CPT

## 2022-05-28 PROCEDURE — 80053 COMPREHEN METABOLIC PANEL: CPT

## 2022-05-28 PROCEDURE — 36415 COLL VENOUS BLD VENIPUNCTURE: CPT

## 2022-05-28 ASSESSMENT — PAIN - FUNCTIONAL ASSESSMENT: PAIN_FUNCTIONAL_ASSESSMENT: NONE - DENIES PAIN

## 2022-05-28 NOTE — ED NOTES
Level of Care Disposition: Discharge      Patient was seen by ED provider and [facility] staff. The case was presented to psychiatric provider on-call Dr. Tiffani Dumont. Based on the ED evaluation and information presented to the provider by this writer, it is the recommendation of the on call psychiatric provider that the patient be discharged from a psychiatric standpoint with SA tx referrals.                   [address] [place] [address]  05/28/22 1500
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
## 2022-05-28 NOTE — ED NOTES
Pt remains asleep in assigned tx room. No signs of distress observed. Will continue to monitor.      [address], [place]  05/28/22 7182

## 2022-05-28 NOTE — ED NOTES
Discharge instructions reviewed with patient. Pt verbalized understanding. Pt escorted to lobby by security.       Aditya Park RN  05/28/22 9158

## 2022-05-28 NOTE — ED NOTES
Pt requested this writer check in his shoe for his Suboxone and Xanax prescriptions. Writer looked in pt's shoe in his locker and found a small container of what appeared to be methamphetamines. A pouch of numerous scored, rectangular pills was also found. Ruth Vann RN clinical was notified. Pt is currently alert and oriented x4. Pt declined to allow writer to remove/waste substances found. Items remain locked in locker until further disposition from on call psychiatric provider.       [address] [place] [address]  05/28/22 2609

## 2022-05-28 NOTE — ED PROVIDER NOTES
CHIEF COMPLAINT  Psychiatric Evaluation (On hold. Police called to [facility] for patient who was seeing people and making claims that these people were stealing his items)      [facility]  Pancho Cummings is a 31 y.o. male with a history of polysubstance abuse, anxiety presenting for evaluation and psychiatric evaluation. He has brought on a behavioral hold by police. Police were called to the hotel that he is staying at. He apparently was having hallucinations, seeing people and paranoid behavior. He states that he had difficulty getting into his hotel room. He states that people are messing with him and inactivated his key card. He states that he talked to the  multiple times because he was unable to get into his room. He states and then people have been spying on him. He states that he called the police because of this. Patient was seen several months ago for paranoid behavior as well and required psychiatric hospitalization. He has had prior methamphetamine abuse. He does endorse taking ecstasy about a week ago. Denies any drug or alcohol use within the past several days. I have reviewed the following from the nursing documentation.    Past Medical History:   Diagnosis Date    Anxiety disorder     Hepatitis C 03/24/2022    Panic attack      Past Surgical History:   Procedure Laterality Date    WISDOM TOOTH EXTRACTION      at age 15     Family History   Problem Relation Age of Onset    High Blood Pressure Father     High Cholesterol Father     Anxiety Disorder Father     Diabetes Paternal Grandmother     Anxiety Disorder Paternal Grandmother      Social History     Socioeconomic History    Marital status: Single     Spouse name: Not on file    Number of children: Not on file    Years of education: 15    Highest education level: Not on file   Occupational History    Occupation:      Employer: UNEMPLOYED   Tobacco Use    Smoking status: Current Every Day Smoker     Packs/day: 1.50     Years: 8.00     Pack years: 12.00    Smokeless tobacco: Never Used   Substance and Sexual Activity    Alcohol use: Yes     Comment: tonight    Drug use: Not Currently    Sexual activity: Not Currently   Other Topics Concern    Not on file   Social History Narrative    Not on file     Social Determinants of Health     Financial Resource Strain:     Difficulty of Paying Living Expenses: Not on file   Food Insecurity:     Worried About Running Out of Food in the Last Year: Not on file    Just[employer] of Food in the Last Year: Not on file   Transportation Needs:     Lack of Transportation (Medical): Not on file    Lack of Transportation (Non-Medical): Not on file   Physical Activity:     Days of Exercise per Week: Not on file    Minutes of Exercise per Session: Not on file   Stress:     Feeling of Stress : Not on file   Social Connections:     Frequency of Communication with Friends and Family: Not on file    Frequency of Social Gatherings with Friends and Family: Not on file    Attends Yazidi Services: Not on file    Active Member of [address] [place] [address] or Organizations: Not on file    Attends Club or Organization Meetings: Not on file    Marital Status: Not on file   Intimate Partner Violence:     Fear of Current or Ex-Partner: Not on file    Emotionally Abused: Not on file    Physically Abused: Not on file    Sexually Abused: Not on file   Housing Stability:     Unable to Pay for Housing in the Last Year: Not on file    Number of [place] in the Last Year: Not on file    Unstable Housing in the Last Year: Not on file     No current facility-administered medications for this encounter. Current Outpatient Medications   Medication Sig Dispense Refill    buprenorphine-naloxone (SUBOXONE) 8-2 MG FILM SL film Place 1 Film under the tongue in the morning and at bedtime.  Pt reported this dose      ibuprofen (IBU) 800 MG tablet Take 1 tablet by mouth every 8 hours as needed for Pain for 10 days. 30 tablet 0     No Known Allergies    REVIEW OF SYSTEMS  Positive and pertinent negatives as per HPI. All other systems were reviewed and are negative. PHYSICAL EXAM  /74   Pulse 87   Temp 98.3 °F (36.8 °C) (Oral)   Resp 16   Ht 5' 11\" (1.803 m)   Wt 215 lb (97.5 kg)   SpO2 100%   BMI 29.99 kg/m²   GENERAL APPEARANCE: Awake and alert. Cooperative. HEAD: Normocephalic. Atraumatic. HEART: RRR. No harsh murmurs. Intact radial pulses 2+ bilaterally. LUNGS: Respirations unlabored without accessory muscle use. Speaking comfortably in full sentences. EXTREMITIES: No peripheral edema. No acute deformities. SKIN: Warm and dry. No acute rashes. NEUROLOGICAL: Alert, no focal deficits    LABS  I have reviewed all labs for this visit.    Results for orders placed or performed during the hospital encounter of 05/28/22   CBC with Auto Differential   Result Value Ref Range    WBC 4.6 4.0 - 11.0 K/uL    RBC 4.16 (L) 4.20 - 5.90 M/uL    Hemoglobin 12.7 (L) 13.5 - 17.5 g/dL    Hematocrit 36.6 (L) 40.5 - 52.5 %    MCV 87.9 80.0 - 100.0 fL    MCH 30.6 26.0 - 34.0 pg    MCHC 34.8 31.0 - 36.0 g/dL    RDW 13.7 12.4 - 15.4 %    Platelets 538 412 - 561 K/uL    MPV 6.7 5.0 - 10.5 fL    Neutrophils % 52.3 %    Lymphocytes % 31.0 %    Monocytes % 11.2 %    Eosinophils % 4.8 %    Basophils % 0.7 %    Neutrophils Absolute 2.4 1.7 - 7.7 K/uL    Lymphocytes Absolute 1.4 1.0 - 5.1 K/uL    Monocytes Absolute 0.5 0.0 - 1.3 K/uL    Eosinophils Absolute 0.2 0.0 - 0.6 K/uL    Basophils Absolute 0.0 0.0 - 0.2 K/uL   Comprehensive Metabolic Panel w/ Reflex to MG   Result Value Ref Range    Sodium 135 (L) 136 - 145 mmol/L    Potassium reflex Magnesium 3.4 (L) 3.5 - 5.1 mmol/L    Chloride 99 99 - 110 mmol/L    CO2 29 21 - 32 mmol/L    Anion Gap 7 3 - 16    Glucose 117 (H) 70 - 99 mg/dL    BUN 6 (L) 7 - 20 mg/dL    CREATININE 1.0 0.9 - 1.3 mg/dL    GFR Non-African American >60 >60    GFR  American >60 >60    Calcium 9.4 8.3 - 10.6 mg/dL    Total Protein 7.2 6.4 - 8.2 g/dL    Albumin 4.7 3.4 - 5.0 g/dL    Albumin/Globulin Ratio 1.9 1.1 - 2.2    Total Bilirubin 0.5 0.0 - 1.0 mg/dL    Alkaline Phosphatase 37 (L) 40 - 129 U/L    ALT 22 10 - 40 U/L    AST 33 15 - 37 U/L   Acetaminophen Level   Result Value Ref Range    Acetaminophen Level <5 (L) 10 - 30 ug/mL   Salicylate   Result Value Ref Range    Salicylate, Serum <5.7 (L) 15.0 - 30.0 mg/dL   Ethanol   Result Value Ref Range    Ethanol Lvl None Detected mg/dL   Drug screen multi urine   Result Value Ref Range    Amphetamine Screen, Urine POSITIVE (A) Negative <1000ng/mL    Barbiturate Screen, Ur Neg Negative <200 ng/mL    Benzodiazepine Screen, Urine POSITIVE (A) Negative <200 ng/mL    Cannabinoid Scrn, Ur Neg Negative <50 ng/mL    Cocaine Metabolite Screen, Urine Neg Negative <300 ng/mL    Opiate Scrn, Ur Neg Negative <300 ng/mL    PCP Screen, Urine Neg Negative <25 ng/mL    Methadone Screen, Urine Neg Negative <300 ng/mL    Propoxyphene Scrn, Ur Neg Negative <300 ng/mL    Oxycodone Urine Neg Negative <100 ng/ml    pH, UA 6.0     Drug Screen Comment: see below    Magnesium   Result Value Ref Range    Magnesium 2.00 1.80 - 2.40 mg/dL       RADIOLOGY  X-RAYS:  I have reviewed radiologic plain film image(s). ALL OTHER NON-PLAIN FILM IMAGES SUCH AS CT, ULTRASOUND AND MRI HAVE BEEN READ BY THE RADIOLOGIST. No orders to display          No results found. During the patient's ED course, the patient was given:  Medications - No data to display     ED COURSE/MDM  Patient seen and evaluated. Old records reviewed. Labs and imaging reviewed and results discussed with patient. 29-year-old male presenting with paranoid delusions, behavior on behavioral hold by police. He arrives with stable vital signs. He is cooperative. We will obtain laboratory evaluation, toxicology screen, urine drug screen.   Patient will require psychiatric evaluation after medical clearance. He does have history of methamphetamine abuse and prior hospitalization for paranoid delusions. Urine drug screen is positive for benzodiazepines, amphetamines. Laboratory evaluation is otherwise unremarkable. Patient is medically clear at this time and will require psychiatric evaluation and disposition per psychiatry. CLINICAL IMPRESSION  1. Paranoia ([address]        Blood pressure 114/74, pulse 87, temperature 98.3 °F (36.8 °C), temperature source Oral, resp. rate 16, height 5' 11\" (1.803 m), weight 215 lb (97.5 kg), SpO2 100 %. DISPOSITION  Kera Neves was pending psychiatric evaluation and disposition. This chart was generated in part by using Dragon Dictation system and may contain errors related to that system including errors in grammar, punctuation, and spelling, as well as words and phrases that may be inappropriate. If there are any questions or concerns please feel free to contact the dictating provider for clarification.      Deanna Rosenthal MD  05/28/22 6708

## 2022-05-28 NOTE — ED NOTES
Pt brought back to [facility] already changed out into safety gown. Pt's belongings placed in locker. Pt oriented to room B3 and [facility] process. Pt resting in room, no signs of distress noted.          Aashish IYER

## 2022-05-31 ENCOUNTER — CARE COORDINATION (OUTPATIENT)
Dept: OTHER | Facility: CLINIC | Age: 37
End: 2022-05-31

## 2022-05-31 NOTE — CARE COORDINATION
[facility] ED Follow Up Call    2022    Patient: Fabio Hudson Patient : 1985   MRN: [id number]  Reason for Admission: Paranoia  Discharge Date: 22      ACM attempted to reach patient for introduction to Associate Care Management related to ER f/u,  CM. HIPAA compliant message left requesting a return phone call. Will attempt to outreach patient again.     - Was to follow up with [facility] for Suboxone tx and counseling. Ashlyn Malik, [facility] Coordinator  Associate Care Management  [address] [place] [address]  Phone: [phone]  [email or web address]

## 2022-06-01 ENCOUNTER — CARE COORDINATION (OUTPATIENT)
Dept: OTHER | Facility: CLINIC | Age: 37
End: 2022-06-01

## 2022-06-01 NOTE — CARE COORDINATION
[facility] ED Follow Up Call    2022    Patient: Lester Cruz Patient : 1985   MRN: [id number]  Reason for Admission: Paranoia  Discharge Date: 22        Care Transitions ED Follow Up    Care Transitions Interventions             ACM attempted 2nd outreach to reach patient for introduction to Associate Care Management. HIPAA compliant message left requesting a return phone call at patients convenience. Unable to Reach Letter sent to patient via mail. Will continue to outreach patient. Ashlyn Howard, [facility] Coordinator  Associate Care Management  [address] [place] [address]  Phone: [phone]  [email or web address]

## 2022-06-09 ENCOUNTER — CARE COORDINATION (OUTPATIENT)
Dept: OTHER | Facility: CLINIC | Age: 37
End: 2022-06-09

## 2022-06-09 NOTE — CARE COORDINATION
[facility] ED Follow Up Call    2022    Patient: Aleida English Patient : 1985   MRN: [id number]  Reason for Admission: Paranoia  Discharge Date: 22        Care Transitions ED Follow Up    Care Transitions Interventions             ACM attempted third and final call to patient for introduction to Associate Care Management. HIPAA compliant message left requesting a return phone call at patients convenience. Final Unable to Reach Letter sent via mail. No further outreach scheduled with this ACM, ACM will sign off care team at this time. Patient has been provided with this ACM's contact information. Ashlyn Mercado, [facility] Coordinator  Associate Care Management  [address] [place] [address]  Phone: [phone]  [email or web address]

## 2022-06-09 NOTE — LETTER
Dear Yan Deutsch-    My name is Jose Daniel Monreal , [address] [facility], and I have been trying to reach you. The Associate Care Management (ACM) program is a free-of-charge, confidential service provided to our employees and their family members covered by the [facility]. I can help you with care transitions such as when you come home from the hospital, when help is needed to manage your disease, or when you need assistance coordinating services or appointments. As healthcare providers, we know that patients do better when they have close follow up with a primary care provider (PCP). I can help you find one that is convenient to you and covered by your insurance. I can also help you understand any after visit instructions, such as what symptoms to watch out for, or any new or changed medications. We can work together using your preferred communication -- telephone, email, MyChart. If you do not have a C[employer]ched account, I can help you request access. Our program is designed to provide you with the opportunity to have a [facility] partner with you for your healthcare needs. Due to not being able to reach you, I am closing out the current program, but will remain available to you should you have any questions. Ashlyn Mercado, [address] Coordinator  Associate Care Management  [address] [place] [address], [address] [place] [address] Street  Phone: [phone]  [email or web address]

## 2022-09-15 ENCOUNTER — HOSPITAL ENCOUNTER (EMERGENCY)
Age: 37
Discharge: HOME | End: 2022-09-15
Payer: COMMERCIAL

## 2022-09-15 VITALS
SYSTOLIC BLOOD PRESSURE: 136 MMHG | HEART RATE: 70 BPM | RESPIRATION RATE: 18 BRPM | DIASTOLIC BLOOD PRESSURE: 87 MMHG | OXYGEN SATURATION: 100 % | BODY MASS INDEX: 26.41 KG/M2 | WEIGHT: 195 LBS | HEIGHT: 72 IN | TEMPERATURE: 98.2 F

## 2022-09-15 DIAGNOSIS — S39.012A STRAIN OF LUMBAR REGION, INITIAL ENCOUNTER: Primary | ICD-10-CM

## 2022-09-15 DIAGNOSIS — M54.32 SCIATICA OF LEFT SIDE: ICD-10-CM

## 2022-09-15 PROCEDURE — 6360000002 HC RX W HCPCS: Performed by: NURSE PRACTITIONER

## 2022-09-15 PROCEDURE — 96372 THER/PROPH/DIAG INJ SC/IM: CPT

## 2022-09-15 PROCEDURE — 6370000000 HC RX 637 (ALT 250 FOR IP): Performed by: NURSE PRACTITIONER

## 2022-09-15 PROCEDURE — 99284 EMERGENCY DEPT VISIT MOD MDM: CPT

## 2022-09-15 RX ORDER — METHOCARBAMOL 500 MG/1
500 TABLET, FILM COATED ORAL ONCE
Status: DISCONTINUED | OUTPATIENT
Start: 2022-09-15 | End: 2022-09-15

## 2022-09-15 RX ORDER — LIDOCAINE 50 MG/G
1 PATCH TOPICAL DAILY
Qty: 10 PATCH | Refills: 0 | Status: SHIPPED | OUTPATIENT
Start: 2022-09-15 | End: 2022-09-25

## 2022-09-15 RX ORDER — PREDNISONE 20 MG/1
TABLET ORAL
Qty: 18 TABLET | Refills: 0 | Status: SHIPPED | OUTPATIENT
Start: 2022-09-15

## 2022-09-15 RX ORDER — LIDOCAINE 4 G/G
1 PATCH TOPICAL ONCE
Status: DISCONTINUED | OUTPATIENT
Start: 2022-09-15 | End: 2022-09-15 | Stop reason: HOSPADM

## 2022-09-15 RX ORDER — ORPHENADRINE CITRATE 30 MG/ML
60 INJECTION INTRAMUSCULAR; INTRAVENOUS ONCE
Status: COMPLETED | OUTPATIENT
Start: 2022-09-15 | End: 2022-09-15

## 2022-09-15 RX ORDER — CYCLOBENZAPRINE HCL 5 MG
5 TABLET ORAL 2 TIMES DAILY PRN
Qty: 4 TABLET | Refills: 0 | Status: SHIPPED | OUTPATIENT
Start: 2022-09-15 | End: 2022-09-17

## 2022-09-15 RX ORDER — PREDNISONE 20 MG/1
60 TABLET ORAL ONCE
Status: COMPLETED | OUTPATIENT
Start: 2022-09-15 | End: 2022-09-15

## 2022-09-15 RX ADMIN — PREDNISONE 60 MG: 20 TABLET ORAL at 17:28

## 2022-09-15 RX ADMIN — ORPHENADRINE CITRATE 60 MG: 30 INJECTION INTRAMUSCULAR; INTRAVENOUS at 17:29

## 2022-09-15 ASSESSMENT — ENCOUNTER SYMPTOMS
BACK PAIN: 1
COLOR CHANGE: 0
RHINORRHEA: 0
ABDOMINAL PAIN: 0
FACIAL SWELLING: 0
SORE THROAT: 0
SHORTNESS OF BREATH: 0

## 2022-09-15 ASSESSMENT — PAIN SCALES - GENERAL
PAINLEVEL_OUTOF10: 8
PAINLEVEL_OUTOF10: 8

## 2022-09-15 ASSESSMENT — PAIN - FUNCTIONAL ASSESSMENT: PAIN_FUNCTIONAL_ASSESSMENT: 0-10

## 2022-09-15 NOTE — ED PROVIDER NOTES
Evaluated by [id number] [facility] Provider          [facility] ED  EMERGENCY DEPARTMENT ENCOUNTER        Pt Name: Yuriy Steward  MRN: [id number]  [place] 1985  Dateof evaluation: 9/15/2022  Provider: EDDIE Stewart - CNP  PCP: Chula Christensen MD  ED Attending: No att. providers found    [address] [place] [address]       Chief Complaint   Patient presents with    Back Pain     Lower back pain. Prior injury. Went to chiropractor yesterday. Pt in a lot of pain today. HISTORY OF PRESENTILLNESS   (Location/Symptom, Timing/Onset, Context/Setting, Quality, Duration, Modifying Factors, Severity)  Note limiting factors. Yuriy Steward is a 31 y.o. male for low back pain. Onset was today. Context includes patient reports that he has had low back pain in the past however he states that it was a few months ago and it resolved. Patient states he woke up yesterday having low back pain. Patient reports he went to his chiropractor yesterday and today he was having difficulty moving due to pain. Patient does report left-sided radiculopathy. He denies any caudal anesthesia. Alleviating factors include nothing. Aggravating factors include nothing. Pain is 8/10. Robaxin, Aleve, Tylenol has been used for pain today. Nursing Notes were all reviewed and agreed with or any disagreements were addressed  in the HPI. REVIEW OF SYSTEMS    (2-9 systems for level 4, 10 or more for level 5)     Review of Systems   Constitutional:  Negative for activity change, appetite change and fever. HENT:  Negative for congestion, facial swelling, rhinorrhea and sore throat. Eyes:  Negative for visual disturbance. Respiratory:  Negative for shortness of breath. Cardiovascular:  Negative for chest pain. Gastrointestinal:  Negative for abdominal pain. Genitourinary:  Negative for difficulty urinating. Musculoskeletal:  Positive for back pain. Negative for arthralgias and myalgias.    Skin:  Negative for color change and rash. Neurological:  Negative for dizziness and light-headedness. Psychiatric/Behavioral:  Negative for agitation. All other systems reviewed and are negative. Positives and Pertinent negatives as per HPI. Except as noted above in the ROS, all other systems were reviewed and negative. PAST MEDICAL HISTORY     Past Medical History:   Diagnosis Date    Anxiety disorder     Hepatitis C 03/24/2022    Panic attack          SURGICAL HISTORY       Past Surgical History:   Procedure Laterality Date    WISDOM TOOTH EXTRACTION      at age 15         CURRENT MEDICATIONS       Discharge Medication List as of 9/15/2022  5:44 PM        CONTINUE these medications which have NOT CHANGED    Details   buprenorphine-naloxone (SUBOXONE) 8-2 MG FILM SL film Place 1 Film under the tongue in the morning and at bedtime. Pt reported this doseHistorical Med      ibuprofen (IBU) 800 MG tablet Take 1 tablet by mouth every 8 hours as needed for Pain for 10 days. , Disp-30 tablet, R-0Normal               ALLERGIES     Patient has no known allergies.     FAMILY HISTORY       Family History   Problem Relation Age of Onset    High Blood Pressure Father     High Cholesterol Father     Anxiety Disorder Father     Diabetes Paternal Grandmother     Anxiety Disorder Paternal Grandmother           SOCIAL HISTORY       Social History     Socioeconomic History    Marital status: Single     Spouse name: None    Number of children: None    Years of education: 12    Highest education level: None   Occupational History    Occupation:      Employer: UNEMPLOYED   Tobacco Use    Smoking status: Every Day     Packs/day: 1.50     Years: 8.00     Pack years: 12.00     Types: Cigarettes    Smokeless tobacco: Never   Substance and Sexual Activity    Alcohol use: Yes     Comment: tonight    Drug use: Not Currently    Sexual activity: Not Currently       SCREENINGS    [place] Coma Scale  Eye Opening: Spontaneous  Best Verbal consultation. The patient and / or the family were informed of the results of any tests, a time was given to answer questions, a plan was proposed and they agreed with plan. FINAL IMPRESSION      1. Strain of lumbar region, initial encounter    2. Sciatica of left side          DISPOSITION/PLAN   DISPOSITION Decision To Discharge 09/15/2022 05:20:42 PM      PATIENT REFERRED TO:  Lori Louis MD  [address] [facility]  [address]  [phone]    Schedule an appointment as soon as possible for a visit in 2 days  for re-evaluation    [facility] [place] [facility]  [address] [facility]  [phone]    If symptoms worsen    [address] [place] [address] [address] [facility], [address] [place] [address]    [phone]  Call   to be re evaluated. [address] [facility]        DISCHARGE MEDICATIONS:  Discharge Medication List as of 9/15/2022  5:44 PM        START taking these medications    Details   predniSONE (DELTASONE) 20 MG tablet Take 3 tabs orally daily for 3 days, then take 2 tabs orally for 3 days then take 1 tab orally for 3 days. , Disp-18 tablet, R-0Print      lidocaine (LIDODERM) 5 % Place 1 patch onto the skin daily for 10 days 12 hours on, 12 hours off., Disp-10 patch, R-0Print      cyclobenzaprine (FLEXERIL) 5 MG tablet Take 1 tablet by mouth 2 times daily as needed for Muscle spasms, Disp-4 tablet, R-0Print             DISCONTINUED MEDICATIONS:  Discharge Medication List as of 9/15/2022  5:44 PM                 (Please note that portions of this note were completed with a voice recognition program.  Efforts were made to edit the dictations but occasionally words are mis-transcribed.)    EDDIE Elliott CNP (electronically signed)         EDDIE Elliott CNP  09/15/22 [id number]

## 2022-11-16 RX ORDER — METHOCARBAMOL 750 MG/1
TABLET, FILM COATED ORAL
Qty: 60 TABLET | Refills: 0 | OUTPATIENT
Start: 2022-11-16

## 2022-11-28 RX ORDER — PRAMIPEXOLE DIHYDROCHLORIDE 0.5 MG/1
TABLET ORAL
Qty: 90 TABLET | Refills: 3 | OUTPATIENT
Start: 2022-11-28

## 2022-12-27 RX ORDER — PROPRANOLOL HCL 60 MG
CAPSULE, EXTENDED RELEASE 24HR ORAL
Qty: 30 CAPSULE | Refills: 3 | OUTPATIENT
Start: 2022-12-27

## 2023-02-06 NOTE — ED NOTES
Conjuntivae and eyelids appear normal,  Sclerae : White without injection Patient awake requesting his Suboxine and Clonidine. Reports he was prescribed by [facility] his Suboxine and was prescribed his Clonidine. Will contact [facility] to verify his medications.       Michelle Capellan RN  03/28/22 9502

## 2023-03-15 ENCOUNTER — HOSPITAL ENCOUNTER (EMERGENCY)
Age: 38
Discharge: HOME | End: 2023-03-16
Payer: COMMERCIAL

## 2023-03-15 ENCOUNTER — APPOINTMENT (OUTPATIENT)
Dept: GENERAL RADIOLOGY | Age: 38
End: 2023-03-15
Payer: COMMERCIAL

## 2023-03-15 DIAGNOSIS — J45.40 MODERATE PERSISTENT ASTHMATIC BRONCHITIS WITHOUT COMPLICATION: Primary | ICD-10-CM

## 2023-03-15 LAB
ANION GAP SERPL CALCULATED.3IONS-SCNC: 8 MMOL/L (ref 3–16)
BASOPHILS # BLD: 0.1 K/UL (ref 0–0.2)
BASOPHILS NFR BLD: 0.7 %
BUN SERPL-MCNC: 11 MG/DL (ref 7–20)
CALCIUM SERPL-MCNC: 9.2 MG/DL (ref 8.3–10.6)
CHLORIDE SERPL-SCNC: 102 MMOL/L (ref 99–110)
CO2 SERPL-SCNC: 27 MMOL/L (ref 21–32)
CREAT SERPL-MCNC: 0.9 MG/DL (ref 0.9–1.3)
DEPRECATED RDW RBC AUTO: 13.1 % (ref 12.4–15.4)
EOSINOPHIL # BLD: 1.1 K/UL (ref 0–0.6)
EOSINOPHIL NFR BLD: 13.4 %
GFR SERPLBLD CREATININE-BSD FMLA CKD-EPI: >60 ML/MIN/{1.73_M2}
GLUCOSE SERPL-MCNC: 101 MG/DL (ref 70–99)
HCT VFR BLD AUTO: 36.1 % (ref 40.5–52.5)
HGB BLD-MCNC: 12.4 G/DL (ref 13.5–17.5)
LYMPHOCYTES # BLD: 1.8 K/UL (ref 1–5.1)
LYMPHOCYTES NFR BLD: 22.8 %
MCH RBC QN AUTO: 29.5 PG (ref 26–34)
MCHC RBC AUTO-ENTMCNC: 34.3 G/DL (ref 31–36)
MCV RBC AUTO: 86 FL (ref 80–100)
MONOCYTES # BLD: 0.7 K/UL (ref 0–1.3)
MONOCYTES NFR BLD: 9.3 %
NEUTROPHILS # BLD: 4.3 K/UL (ref 1.7–7.7)
NEUTROPHILS NFR BLD: 53.8 %
NT-PROBNP SERPL-MCNC: 78 PG/ML (ref 0–124)
PLATELET # BLD AUTO: 270 K/UL (ref 135–450)
PMV BLD AUTO: 6.8 FL (ref 5–10.5)
POTASSIUM SERPL-SCNC: 4.2 MMOL/L (ref 3.5–5.1)
RBC # BLD AUTO: 4.19 M/UL (ref 4.2–5.9)
SODIUM SERPL-SCNC: 137 MMOL/L (ref 136–145)
TROPONIN T SERPL-MCNC: <0.01 NG/ML
WBC # BLD AUTO: 8 K/UL (ref 4–11)

## 2023-03-15 PROCEDURE — 84484 ASSAY OF TROPONIN QUANT: CPT

## 2023-03-15 PROCEDURE — 93005 ELECTROCARDIOGRAM TRACING: CPT | Performed by: PHYSICIAN ASSISTANT

## 2023-03-15 PROCEDURE — 96374 THER/PROPH/DIAG INJ IV PUSH: CPT

## 2023-03-15 PROCEDURE — 6370000000 HC RX 637 (ALT 250 FOR IP): Performed by: PHYSICIAN ASSISTANT

## 2023-03-15 PROCEDURE — 83880 ASSAY OF NATRIURETIC PEPTIDE: CPT

## 2023-03-15 PROCEDURE — 71045 X-RAY EXAM CHEST 1 VIEW: CPT

## 2023-03-15 PROCEDURE — 2580000003 HC RX 258: Performed by: PHYSICIAN ASSISTANT

## 2023-03-15 PROCEDURE — 85025 COMPLETE CBC W/AUTO DIFF WBC: CPT

## 2023-03-15 PROCEDURE — 87636 SARSCOV2 & INF A&B AMP PRB: CPT

## 2023-03-15 PROCEDURE — 99285 EMERGENCY DEPT VISIT HI MDM: CPT

## 2023-03-15 PROCEDURE — 80048 BASIC METABOLIC PNL TOTAL CA: CPT

## 2023-03-15 PROCEDURE — 6360000002 HC RX W HCPCS: Performed by: PHYSICIAN ASSISTANT

## 2023-03-15 RX ORDER — GABAPENTIN 600 MG/1
TABLET ORAL
COMMUNITY
Start: 2023-01-19

## 2023-03-15 RX ORDER — CLONIDINE HYDROCHLORIDE 0.1 MG/1
TABLET ORAL
COMMUNITY
Start: 2023-02-24

## 2023-03-15 RX ORDER — METHYLPREDNISOLONE SODIUM SUCCINATE 125 MG/2ML
125 INJECTION, POWDER, LYOPHILIZED, FOR SOLUTION INTRAMUSCULAR; INTRAVENOUS ONCE
Status: COMPLETED | OUTPATIENT
Start: 2023-03-15 | End: 2023-03-15

## 2023-03-15 RX ORDER — 0.9 % SODIUM CHLORIDE 0.9 %
1000 INTRAVENOUS SOLUTION INTRAVENOUS ONCE
Status: COMPLETED | OUTPATIENT
Start: 2023-03-15 | End: 2023-03-16

## 2023-03-15 RX ORDER — BUSPIRONE HYDROCHLORIDE 15 MG/1
TABLET ORAL
COMMUNITY
Start: 2023-03-14

## 2023-03-15 RX ORDER — AMOXICILLIN AND CLAVULANATE POTASSIUM 875; 125 MG/1; MG/1
TABLET, FILM COATED ORAL
COMMUNITY
Start: 2023-03-07

## 2023-03-15 RX ORDER — DULOXETIN HYDROCHLORIDE 60 MG/1
CAPSULE, DELAYED RELEASE ORAL
COMMUNITY
Start: 2023-02-24

## 2023-03-15 RX ORDER — IPRATROPIUM BROMIDE AND ALBUTEROL SULFATE 2.5; .5 MG/3ML; MG/3ML
1 SOLUTION RESPIRATORY (INHALATION) ONCE
Status: COMPLETED | OUTPATIENT
Start: 2023-03-15 | End: 2023-03-15

## 2023-03-15 RX ORDER — QUETIAPINE FUMARATE 25 MG/1
TABLET, FILM COATED ORAL
COMMUNITY
Start: 2023-01-10

## 2023-03-15 RX ORDER — ALBUTEROL SULFATE 2.5 MG/3ML
2.5 SOLUTION RESPIRATORY (INHALATION) ONCE
Status: COMPLETED | OUTPATIENT
Start: 2023-03-15 | End: 2023-03-15

## 2023-03-15 RX ORDER — ALBUTEROL SULFATE 2.5 MG/3ML
5 SOLUTION RESPIRATORY (INHALATION) ONCE
Status: COMPLETED | OUTPATIENT
Start: 2023-03-15 | End: 2023-03-15

## 2023-03-15 RX ORDER — PROPRANOLOL HCL 60 MG
CAPSULE, EXTENDED RELEASE 24HR ORAL DAILY
COMMUNITY
Start: 2022-09-19

## 2023-03-15 RX ORDER — HYDROXYZINE HYDROCHLORIDE 25 MG/1
TABLET, FILM COATED ORAL
COMMUNITY
Start: 2023-02-24

## 2023-03-15 RX ADMIN — METHYLPREDNISOLONE SODIUM SUCCINATE 125 MG: 125 INJECTION, POWDER, FOR SOLUTION INTRAMUSCULAR; INTRAVENOUS at 22:51

## 2023-03-15 RX ADMIN — ALBUTEROL SULFATE 2.5 MG: 2.5 SOLUTION RESPIRATORY (INHALATION) at 23:33

## 2023-03-15 RX ADMIN — IPRATROPIUM BROMIDE AND ALBUTEROL SULFATE 1 AMPULE: .5; 2.5 SOLUTION RESPIRATORY (INHALATION) at 23:33

## 2023-03-15 RX ADMIN — IPRATROPIUM BROMIDE AND ALBUTEROL SULFATE 1 AMPULE: .5; 2.5 SOLUTION RESPIRATORY (INHALATION) at 22:51

## 2023-03-15 RX ADMIN — ALBUTEROL SULFATE 5 MG: 2.5 SOLUTION RESPIRATORY (INHALATION) at 22:51

## 2023-03-15 RX ADMIN — SODIUM CHLORIDE 1000 ML: 9 INJECTION, SOLUTION INTRAVENOUS at 22:50

## 2023-03-15 NOTE — Clinical Note
Jasmeet Steen was seen and treated in our emergency department on 3/15/2023. He may return to work on 03/20/2023. If you have any questions or concerns, please don't hesitate to call.       Alejandra Figueroa PA-C

## 2023-03-16 VITALS
TEMPERATURE: 98.3 F | WEIGHT: 238.5 LBS | OXYGEN SATURATION: 98 % | DIASTOLIC BLOOD PRESSURE: 60 MMHG | RESPIRATION RATE: 18 BRPM | SYSTOLIC BLOOD PRESSURE: 124 MMHG | BODY MASS INDEX: 32.35 KG/M2 | HEART RATE: 78 BPM

## 2023-03-16 LAB
FLUAV RNA RESP QL NAA+PROBE: NOT DETECTED
FLUBV RNA RESP QL NAA+PROBE: NOT DETECTED
SARS-COV-2 RNA RESP QL NAA+PROBE: NOT DETECTED

## 2023-03-16 RX ORDER — ALBUTEROL SULFATE 90 UG/1
1-2 AEROSOL, METERED RESPIRATORY (INHALATION) EVERY 6 HOURS PRN
Qty: 18 G | Refills: 0 | Status: SHIPPED | OUTPATIENT
Start: 2023-03-16

## 2023-03-16 RX ORDER — PREDNISONE 10 MG/1
TABLET ORAL
Qty: 18 TABLET | Refills: 0 | Status: SHIPPED | OUTPATIENT
Start: 2023-03-16 | End: 2023-03-26

## 2023-03-16 NOTE — ED PROVIDER NOTES
[address] [facility]  ED  EMERGENCY DEPARTMENT ENCOUNTER        Pt Name: Maureen Early  MRN: [id number]  [place] 1985  Date of evaluation: 3/15/2023  Provider: Regan Ferrell PA-C  PCP: Ella Hilton MD  Note Started: 10:35 PM EDT 3/15/23      [place]. I have evaluated this patient. My supervising physician was available for consultation. CHIEF COMPLAINT       Chief Complaint   Patient presents with    Shortness of Breath     Ongoing for about a week, on abx for sinus infection. States worsening symp. HISTORY OF PRESENT ILLNESS: 1 or more Elements     History From: Patient and mother    Limitations to history : None    Maureen Early is a 30 y.o. male who presents to the emergency department with his mother. Patient reports onset 2 to 3 weeks ago with sinus infection. He has been on amoxicillin, Z-Kye and more recently Augmentin which she finished a few days ago. He indicates his sinuses have improved but he has had a persisting cough over the past 1 week. The patient does smoke. He is quit about 3 months ago. The patient does not have history of asthma/COPD. Patient reports over the past 5 days his shortness of breath, worse with exertion, cough without productivity and chest tightness have progressed. He comes in tonight short of breath and audibly wheezing. He speaks about 5 words per sentence. Not in acute respiratory distress. His heart rate is 91 and respiratory rate 22. Pulse oximetry room air is 97%. Nursing Notes were all reviewed and agreed with or any disagreements were addressed in the HPI. REVIEW OF SYSTEMS :      Review of Systems    Positives and Pertinent negatives as per HPI.      SURGICAL HISTORY     Past Surgical History:   Procedure Laterality Date    WISDOM TOOTH EXTRACTION      at age 15       CURRENTMEDICATIONS       Previous Medications    AMOXICILLIN-CLAVULANATE (AUGMENTIN) 875-125 MG PER TABLET    TAKE 1 TABLET BY MOUTH TWICE DAILY BUPRENORPHINE-NALOXONE (SUBOXONE) 8-2 MG FILM SL FILM    Place 1 Film under the tongue in the morning and at bedtime. Pt reported this dose    BUSPIRONE (BUSPAR) 15 MG TABLET    TAKE ONE TABLET BY MOUTH THREE TIMES DAILY FOR ANXIETY    CLONIDINE (CATAPRES) 0.1 MG TABLET    TAKE 1 TO 2 TABLETS BY MOUTH THREE TIMES DAILY AS NEEDED    DULOXETINE (CYMBALTA) 60 MG EXTENDED RELEASE CAPSULE    TAKE ONE CAPSULE BY MOUTH TWICE DAILY    GABAPENTIN (NEURONTIN) 600 MG TABLET    TAKE 1 TABLET BY MOUTH FOUR TIMES DAILY    HYDROXYZINE HCL (ATARAX) 25 MG TABLET    TAKE 1 TO 2 TABLETS BY MOUTH THREE TIMES DAILY AS NEEDED    IBUPROFEN (IBU) 800 MG TABLET    Take 1 tablet by mouth every 8 hours as needed for Pain for 10 days. PROPRANOLOL (INDERAL LA) 60 MG EXTENDED RELEASE CAPSULE    Take by mouth daily    QUETIAPINE (SEROQUEL) 25 MG TABLET    TAKE 1 ORAL TABLET 3 TIMES DAILY AS NEEDED FOR ANXIETY. ALLERGIES     Patient has no known allergies. FAMILYHISTORY       Family History   Problem Relation Age of Onset    High Blood Pressure Father     High Cholesterol Father     Anxiety Disorder Father     Diabetes Paternal Grandmother     Anxiety Disorder Paternal Grandmother         SOCIAL HISTORY       Social History     Tobacco Use    Smoking status: Every Day     Packs/day: 1.50     Years: 8.00     Pack years: 12.00     Types: Cigarettes    Smokeless tobacco: Never   Substance Use Topics    Alcohol use: Yes     Comment: tonight    Drug use: Not Currently       SCREENINGS                         CIWA Assessment  BP: (!) 144/81  Heart Rate: 91           PHYSICAL EXAM  1 or more Elements     ED Triage Vitals   BP Temp Temp Source Heart Rate Resp SpO2 Height Weight   03/15/23 2224 03/15/23 2225 03/15/23 2225 03/15/23 2224 03/15/23 2224 03/15/23 2224 -- 03/15/23 2222   (!) 144/81 98.3 °F (36.8 °C) Oral (!) 102 22 97 %  238 lb 8 oz (108.2 kg)       Physical Exam  Vitals and nursing note reviewed.    Constitutional:       Appearance: Normal appearance. He is well-developed. He is obese. HENT:      Head: Normocephalic and atraumatic. Right Ear: External ear normal.      Left Ear: External ear normal.      Nose:      Comments: The patient exhibits no frontal or maxillary sinus tenderness with pressure applied. Mouth/Throat:      Mouth: Mucous membranes are moist.      Pharynx: Oropharynx is clear. Eyes:      General: No scleral icterus. Right eye: No discharge. Left eye: No discharge. Conjunctiva/sclera: Conjunctivae normal.   Cardiovascular:      Rate and Rhythm: Normal rate and regular rhythm. Heart sounds: Normal heart sounds. Pulmonary:      Effort: Pulmonary effort is normal. No respiratory distress. Breath sounds: Wheezing present. Musculoskeletal:         General: Normal range of motion. Cervical back: Normal range of motion and neck supple. Right lower leg: No edema. Left lower leg: No edema. Skin:     General: Skin is warm and dry. Neurological:      General: No focal deficit present. Mental Status: He is alert and oriented to person, place, and time. Mental status is at baseline. Psychiatric:         Mood and Affect: Mood normal.         Behavior: Behavior normal.         Thought Content: Thought content normal.         Judgment: Judgment normal.       DIAGNOSTIC RESULTS   LABS:    Labs Reviewed   BASIC METABOLIC PANEL W/ REFLEX TO MG FOR LOW K - Abnormal; Notable for the following components:       Result Value    Glucose 101 (*)     All other components within normal limits   CBC WITH AUTO DIFFERENTIAL - Abnormal; Notable for the following components:    RBC 4.19 (*)     Hemoglobin 12.4 (*)     Hematocrit 36.1 (*)     Eosinophils Absolute 1.1 (*)     All other components within normal limits   COVID-19 & INFLUENZA COMBO   TROPONIN   BRAIN NATRIURETIC PEPTIDE       When ordered only abnormal lab results are displayed.  All other labs were within normal range or not returned as of this dictation. EKG: When ordered, EKG's are interpreted by the Emergency Department Physician in the absence of a cardiologist.  Please see their note for interpretation of EKG. RADIOLOGY:   Non-plain film images such as CT, Ultrasound and MRI are read by the radiologist. Plain radiographic images are visualized and preliminarily interpreted by the ED Provider with the below findings:    Chest x-ray viewed by myself and interpreted by radiology shows no acute cardiopulmonary abnormality. Interpretation per the Radiologist below, if available at the time of this note:    XR CHEST PORTABLE   Preliminary Result   No acute cardiopulmonary abnormality. No results found. No results found. PROCEDURES   Unless otherwise noted below, none     Procedures    CRITICAL CARE TIME (.cctime)       PAST MEDICAL HISTORY      has a past medical history of Anxiety disorder, Hepatitis C (03/24/2022), and Panic attack.      EMERGENCY DEPARTMENT COURSE and DIFFERENTIAL DIAGNOSIS/MDM:   Vitals:    Vitals:    03/15/23 2222 03/15/23 2224 03/15/23 2225   BP:  (!) 144/81    Pulse:  (!) 102 91   Resp:  22    Temp:   98.3 °F (36.8 °C)   TempSrc:   Oral   SpO2:  97%    Weight: 238 lb 8 oz (108.2 kg)         Patient was given the following medications:  Medications   ipratropium-albuterol (DUONEB) nebulizer solution 1 ampule (1 ampule Inhalation Given 3/15/23 2251)   albuterol (PROVENTIL) nebulizer solution 5 mg (5 mg Nebulization Given 3/15/23 2251)   methylPREDNISolone sodium (SOLU-MEDROL) injection 125 mg (125 mg IntraVENous Given 3/15/23 2251)   0.9 % sodium chloride bolus (1,000 mLs IntraVENous New Bag 3/15/23 2250)   albuterol (PROVENTIL) nebulizer solution 2.5 mg (2.5 mg Nebulization Given 3/15/23 2333)   ipratropium-albuterol (DUONEB) nebulizer solution 1 ampule (1 ampule Inhalation Given 3/15/23 2333)             Is this patient to be included in the SEP-1 Core Measure due to severe sepsis or septic shock? No   Exclusion criteria - the patient is NOT to be included for SEP-1 Core Measure due to: Infection is not suspected    Chronic Conditions affecting care: Former smoker-quit 3 months ago. has a past medical history of Anxiety disorder, Hepatitis C (03/24/2022), and Panic attack. CONSULTS: (Who and What was discussed)  None    Social Determinants Significantly Affecting Health : None    Records Reviewed (External and Source) none    CC/HPI Summary, DDx, ED Course, and Reassessment: This patient presenting with 2-week - 3-week history of respiratory illness described as sinus congestion and sinus pressure. He has been on amoxicillin, Z-Kye and more recently Augmentin. He finished Augmentin several days ago. Some nasal congestion remains. No frontal headache. He does indicate shortness of breath and wheezing. Chest tight. Clinically he does have significant bronchospasm. Chest x-ray negative for pneumonia. I did administer DuoNeb x2 and albuterol x3. Patient much improved and breathing more easily. The patient does not require additional antibiotics. I did start patient on tapered steroid beginning 30 mg and tapering over 9 days. I also started patient on Ventolin inhaler. He has never used 1 or has 1. I did dispense his with spacer. I recommended OTC Afrin nasal spray 1 spray each nostril twice daily x3 days then discontinue. He may restart after 4 days of no use. Concurrently I did recommend use of Flonase and Astepro nasal spray 1 spray each nostril twice daily for at least 1 week and longer as needed. The patient off work until Monday. Note given. The patient mother both expressed understanding of the diagnosis and the treatment plan. Disposition Considerations (tests considered but not done, Admit vs D/C, Shared Decision Making, Pt Expectation of Test or Tx.):     This patient be discharged home with diagnosis acute asthmatic bronchitis.   Patient is sending without bronchospasm. Not hypoxic. The patient did receive DuoNeb x2 albuterol x3. Much improvement. Patient did receive NaCl 1 L along with Solu-Medrol 125 mg IV x1. I did send prescription for Ventolin inhaler and steroids to his local pharmacy. Off work and return next Monday. Note given. X-ray was negative for pneumonia. The patient mother expressed understanding of his diagnosis and the treatment plan. I am the Primary Clinician of Record. FINAL IMPRESSION      1. Moderate persistent asthmatic bronchitis without complication          DISPOSITION/PLAN     DISPOSITION Decision To Discharge 03/15/2023 11:58:39 PM      PATIENT REFERRED TO:  MD Liberty Blanca [address]  [place] [address]  [phone]    Schedule an appointment as soon as possible for a visit in 2 days      [address] [place] [address]  Schedule an appointment as soon as possible for a visit in 2 days      [facility]  ED  [address]  Go to   If symptoms worsen    Akiko Reyes MD  [facility], [address] [place] [address]    Schedule an appointment as soon as possible for a visit in 1 week      DISCHARGE MEDICATIONS:  New Prescriptions    ALBUTEROL SULFATE HFA (PROVENTIL;VENTOLIN;PROAIR) 108 (90 BASE) MCG/ACT INHALER    Inhale 1-2 puffs into the lungs every 6 hours as needed for Wheezing    PREDNISONE (DELTASONE) 10 MG TABLET    3 tabs po qam for 3 days then 2 tabs qam for 3 days the 1 tab qam for 3 days       DISCONTINUED MEDICATIONS:  Discontinued Medications    PREDNISONE (DELTASONE) 20 MG TABLET    Take 3 tabs orally daily for 3 days, then take 2 tabs orally for 3 days then take 1 tab orally for 3 days. (Please note that portions of this note were completed with a voice recognition program.  Efforts were made to edit the dictations but occasionally words are mis-transcribed. )    Kerri Reed, RYANN (electronically signed)        Nilsa bAarca PA-C  03/16/23 0006

## 2023-03-16 NOTE — ED NOTES
--Patient provided with discharge instructions and any prescriptions. --Instructions, dosing, and follow-up appointments reviewed with patient/family. No further questions or needs at this time. --Vital signs and patient stable upon discharge. --Patient ambulatory to [facility].        Joaquim Rosenbaum RN  03/16/23 0010

## 2023-03-16 NOTE — DISCHARGE INSTRUCTIONS
I did give you information on asthma. I am not diagnosing you with asthma. You do have acute bronchitis with associated bronchospastic component. You have been on 3 different antibiotics. Continuation antibiotics not indicated. In the emergency room I gave you DuoNeb x2 and albuterol x3. Your breathing became easier and there was less wheezing. He did get first dose steroid Solu-Medrol 125 mg IV. I will send prescriptions to your local pharmacy and you will pick them up tomorrow morning and take them as prescribed. We also discussed OTC use of Afrin nasal spray, Flonase and Astepro. I would like you to use Afrin nasal spray each nostril twice daily x3 days then stop. At the same time I want you to use Flonase and Astepro 1 spray each nostril twice daily and this can be used continually. If you find that you need more the Afrin spray you must wait 4 days before using it for 3-day cycle.

## 2023-03-17 LAB
EKG ATRIAL RATE: 85 BPM
EKG DIAGNOSIS: NORMAL
EKG P AXIS: 51 DEGREES
EKG P-R INTERVAL: 164 MS
EKG Q-T INTERVAL: 366 MS
EKG QRS DURATION: 84 MS
EKG QTC CALCULATION (BAZETT): 435 MS
EKG R AXIS: 6 DEGREES
EKG T AXIS: 9 DEGREES
EKG VENTRICULAR RATE: 85 BPM

## 2023-03-17 PROCEDURE — 93010 ELECTROCARDIOGRAM REPORT: CPT | Performed by: INTERNAL MEDICINE
